# Patient Record
Sex: MALE | URBAN - METROPOLITAN AREA
[De-identification: names, ages, dates, MRNs, and addresses within clinical notes are randomized per-mention and may not be internally consistent; named-entity substitution may affect disease eponyms.]

---

## 2023-05-10 ENCOUNTER — APPOINTMENT (RX ONLY)
Dept: URBAN - METROPOLITAN AREA CLINIC 151 | Facility: CLINIC | Age: 63
Setting detail: DERMATOLOGY
End: 2023-05-10

## 2023-05-10 DIAGNOSIS — L92.3 FOREIGN BODY GRANULOMA OF THE SKIN AND SUBCUTANEOUS TISSUE: ICD-10-CM

## 2023-05-10 PROCEDURE — ? COUNSELING

## 2023-05-10 PROCEDURE — 10120 INC&RMVL FB SUBQ TISS SMPL: CPT

## 2023-05-10 PROCEDURE — ? FOREIGN BODY REMOVAL

## 2023-05-10 PROCEDURE — ? OTHER

## 2023-05-10 ASSESSMENT — LOCATION ZONE DERM: LOCATION ZONE: FINGER

## 2023-05-10 ASSESSMENT — LOCATION SIMPLE DESCRIPTION DERM
LOCATION SIMPLE: LEFT MIDDLE FINGER
LOCATION SIMPLE: RIGHT THUMB

## 2023-05-10 ASSESSMENT — LOCATION DETAILED DESCRIPTION DERM
LOCATION DETAILED: RIGHT DISTAL RADIAL THUMB
LOCATION DETAILED: LEFT DISTAL PALMAR MIDDLE FINGER

## 2023-05-10 NOTE — PROCEDURE: FOREIGN BODY REMOVAL
Anesthesia Type: 1% lidocaine with epinephrine
Bill For Simple Or Complicated Fb Removal?: simple
Anesthesia Volume In Cc: 0.5
Detail Level: Detailed

## 2023-05-10 NOTE — PROCEDURE: OTHER
Other (Free Text): Cryosurgical destruction performed
Render Risk Assessment In Note?: no
Note Text (......Xxx Chief Complaint.): This diagnosis correlates with the
Detail Level: Zone